# Patient Record
Sex: MALE | Race: OTHER | Employment: FULL TIME | URBAN - METROPOLITAN AREA
[De-identification: names, ages, dates, MRNs, and addresses within clinical notes are randomized per-mention and may not be internally consistent; named-entity substitution may affect disease eponyms.]

---

## 2017-01-12 ENCOUNTER — OFFICE VISIT (OUTPATIENT)
Dept: FAMILY MEDICINE CLINIC | Facility: CLINIC | Age: 52
End: 2017-01-12

## 2017-01-12 VITALS
WEIGHT: 232 LBS | HEART RATE: 81 BPM | SYSTOLIC BLOOD PRESSURE: 118 MMHG | BODY MASS INDEX: 33 KG/M2 | DIASTOLIC BLOOD PRESSURE: 73 MMHG

## 2017-01-12 DIAGNOSIS — R93.1 DECREASED CARDIAC EJECTION FRACTION: Primary | ICD-10-CM

## 2017-01-12 PROCEDURE — 99212 OFFICE O/P EST SF 10 MIN: CPT | Performed by: FAMILY MEDICINE

## 2017-01-12 PROCEDURE — 99214 OFFICE O/P EST MOD 30 MIN: CPT | Performed by: FAMILY MEDICINE

## 2017-01-12 RX ORDER — EPINEPHRINE 0.3 MG/.3ML
0.3 INJECTION SUBCUTANEOUS ONCE
Qty: 1 EACH | Refills: 1 | Status: SHIPPED | OUTPATIENT
Start: 2017-01-12 | End: 2017-01-12

## 2017-01-12 RX ORDER — CEPHALEXIN 500 MG/1
500 TABLET ORAL 4 TIMES DAILY
Qty: 40 TABLET | Refills: 0 | Status: SHIPPED | OUTPATIENT
Start: 2017-01-12 | End: 2017-01-12

## 2017-01-12 RX ORDER — ESCITALOPRAM OXALATE 10 MG/1
10 TABLET ORAL DAILY
Qty: 30 TABLET | Refills: 2 | Status: SHIPPED | OUTPATIENT
Start: 2017-01-12 | End: 2017-07-18

## 2017-01-12 NOTE — PROGRESS NOTES
Blood pressure 118/73, pulse 81, weight 232 lb (105.235 kg). Patient presents today complaining of feeling weak in the knees whenever he enters a large store. He denies chest pain or dyspnea. angiogram within the past 3 years.   He also reports that h

## 2017-01-23 ENCOUNTER — HOSPITAL ENCOUNTER (OUTPATIENT)
Dept: CARDIOLOGY CLINIC | Age: 52
Discharge: HOME | End: 2017-01-23
Attending: FAMILY MEDICINE
Payer: COMMERCIAL

## 2017-01-23 ENCOUNTER — LAB ENCOUNTER (OUTPATIENT)
Dept: LAB | Age: 52
End: 2017-01-23
Attending: FAMILY MEDICINE
Payer: COMMERCIAL

## 2017-01-23 DIAGNOSIS — R93.1 DECREASED CARDIAC EJECTION FRACTION: ICD-10-CM

## 2017-01-23 LAB
ANION GAP SERPL CALC-SCNC: 10 MMOL/L (ref 0–18)
BASOPHILS # BLD: 0 K/UL (ref 0–0.2)
BASOPHILS NFR BLD: 1 %
BUN SERPL-MCNC: 15 MG/DL (ref 8–20)
BUN/CREAT SERPL: 12.7 (ref 10–20)
CALCIUM SERPL-MCNC: 9.5 MG/DL (ref 8.5–10.5)
CHLORIDE SERPL-SCNC: 106 MMOL/L (ref 95–110)
CHOLEST SERPL-MCNC: 194 MG/DL (ref 110–200)
CO2 SERPL-SCNC: 23 MMOL/L (ref 22–32)
CREAT SERPL-MCNC: 1.18 MG/DL (ref 0.5–1.5)
EOSINOPHIL # BLD: 0.1 K/UL (ref 0–0.7)
EOSINOPHIL NFR BLD: 1 %
ERYTHROCYTE [DISTWIDTH] IN BLOOD BY AUTOMATED COUNT: 13.5 % (ref 11–15)
GLUCOSE SERPL-MCNC: 117 MG/DL (ref 70–99)
HCT VFR BLD AUTO: 47.1 % (ref 41–52)
HDLC SERPL-MCNC: 38 MG/DL
HGB BLD-MCNC: 16 G/DL (ref 13.5–17.5)
LDLC SERPL CALC-MCNC: 113 MG/DL (ref 0–99)
LYMPHOCYTES # BLD: 2.3 K/UL (ref 1–4)
LYMPHOCYTES NFR BLD: 34 %
MCH RBC QN AUTO: 30.5 PG (ref 27–32)
MCHC RBC AUTO-ENTMCNC: 34 G/DL (ref 32–37)
MCV RBC AUTO: 89.8 FL (ref 80–100)
MONOCYTES # BLD: 0.4 K/UL (ref 0–1)
MONOCYTES NFR BLD: 7 %
NEUTROPHILS # BLD AUTO: 3.8 K/UL (ref 1.8–7.7)
NEUTROPHILS NFR BLD: 57 %
NONHDLC SERPL-MCNC: 156 MG/DL
OSMOLALITY UR CALC.SUM OF ELEC: 290 MOSM/KG (ref 275–295)
PLATELET # BLD AUTO: 154 K/UL (ref 140–400)
PMV BLD AUTO: 9.5 FL (ref 7.4–10.3)
POTASSIUM SERPL-SCNC: 3.9 MMOL/L (ref 3.3–5.1)
PSA SERPL-MCNC: 1.5 NG/ML (ref 0–4)
RBC # BLD AUTO: 5.24 M/UL (ref 4.5–5.9)
SODIUM SERPL-SCNC: 139 MMOL/L (ref 136–144)
TRIGL SERPL-MCNC: 216 MG/DL (ref 1–149)
TSH SERPL-ACNC: 2.08 UIU/ML (ref 0.34–5.6)
WBC # BLD AUTO: 6.7 K/UL (ref 4–11)

## 2017-01-23 PROCEDURE — 85025 COMPLETE CBC W/AUTO DIFF WBC: CPT

## 2017-01-23 PROCEDURE — 80061 LIPID PANEL: CPT

## 2017-01-23 PROCEDURE — 93306 TTE W/DOPPLER COMPLETE: CPT

## 2017-01-23 PROCEDURE — 80048 BASIC METABOLIC PNL TOTAL CA: CPT

## 2017-01-23 PROCEDURE — 84443 ASSAY THYROID STIM HORMONE: CPT

## 2017-01-23 PROCEDURE — 36415 COLL VENOUS BLD VENIPUNCTURE: CPT

## 2017-01-23 PROCEDURE — 93306 TTE W/DOPPLER COMPLETE: CPT | Performed by: INTERNAL MEDICINE

## 2017-01-24 ENCOUNTER — TELEPHONE (OUTPATIENT)
Dept: FAMILY MEDICINE CLINIC | Facility: CLINIC | Age: 52
End: 2017-01-24

## 2017-01-24 NOTE — TELEPHONE ENCOUNTER
----- Message from Saul Martinez DO sent at 1/23/2017  7:54 PM CST -----  BLOOD SUGAR HIGH NOT DIABETIC ALSO ECHO SHOWED MILDLY DECREASED EJECTION FRACTION.   PATIENT TO MAKE FU APPT TO SEE ME.

## 2017-01-24 NOTE — TELEPHONE ENCOUNTER
Pt informed. Verbalized good understanding of all with intent to comply. appt made. Pt agreed to date and time.   results released to [employer] per pt request.

## 2017-01-26 ENCOUNTER — OFFICE VISIT (OUTPATIENT)
Dept: FAMILY MEDICINE CLINIC | Facility: CLINIC | Age: 52
End: 2017-01-26

## 2017-01-26 VITALS
HEART RATE: 85 BPM | SYSTOLIC BLOOD PRESSURE: 126 MMHG | BODY MASS INDEX: 32 KG/M2 | WEIGHT: 227.81 LBS | DIASTOLIC BLOOD PRESSURE: 81 MMHG

## 2017-01-26 DIAGNOSIS — F41.0 PANIC: ICD-10-CM

## 2017-01-26 DIAGNOSIS — R73.9 HYPERGLYCEMIA: Primary | ICD-10-CM

## 2017-01-26 DIAGNOSIS — R93.1 DECREASED CARDIAC EJECTION FRACTION: ICD-10-CM

## 2017-01-26 PROCEDURE — 99212 OFFICE O/P EST SF 10 MIN: CPT | Performed by: FAMILY MEDICINE

## 2017-01-26 PROCEDURE — 99213 OFFICE O/P EST LOW 20 MIN: CPT | Performed by: FAMILY MEDICINE

## 2017-01-26 NOTE — PROGRESS NOTES
Blood pressure 126/81, pulse 85, weight 227 lb 12.8 oz (103.329 kg). Patient presents today following up for elevated blood sugar reading history of decreased ejection fraction. He currently feels well. Is losing weight deliberately.   He stopped april

## 2017-07-17 ENCOUNTER — TELEPHONE (OUTPATIENT)
Dept: FAMILY MEDICINE CLINIC | Facility: CLINIC | Age: 52
End: 2017-07-17

## 2017-07-17 NOTE — TELEPHONE ENCOUNTER
Actions Requested: appt made Tuesday [facility] at [facility]. (spouse called for patient)  Problem: ankle, pain 6/10  Onset and Timin attacks since April. Associated Symptoms:    Aggravating by:    Alleviated by:  Cannot take NSAIDS,     Triage Note:  Per Spouse,

## 2017-07-18 ENCOUNTER — OFFICE VISIT (OUTPATIENT)
Dept: FAMILY MEDICINE CLINIC | Facility: CLINIC | Age: 52
End: 2017-07-18

## 2017-07-18 VITALS
SYSTOLIC BLOOD PRESSURE: 122 MMHG | WEIGHT: 225.81 LBS | BODY MASS INDEX: 32.33 KG/M2 | HEIGHT: 70 IN | HEART RATE: 75 BPM | TEMPERATURE: 98 F | DIASTOLIC BLOOD PRESSURE: 82 MMHG

## 2017-07-18 DIAGNOSIS — M79.671 FOOT PAIN, BILATERAL: Primary | ICD-10-CM

## 2017-07-18 DIAGNOSIS — M79.672 FOOT PAIN, BILATERAL: Primary | ICD-10-CM

## 2017-07-18 PROCEDURE — 99212 OFFICE O/P EST SF 10 MIN: CPT | Performed by: FAMILY MEDICINE

## 2017-07-18 PROCEDURE — 99214 OFFICE O/P EST MOD 30 MIN: CPT | Performed by: FAMILY MEDICINE

## 2017-07-18 RX ORDER — COLCHICINE 0.6 MG/1
0.6 TABLET ORAL 4 TIMES DAILY PRN
Qty: 45 TABLET | Refills: 0 | Status: SHIPPED | OUTPATIENT
Start: 2017-07-18 | End: 2017-11-02 | Stop reason: ALTCHOICE

## 2017-07-18 NOTE — PROGRESS NOTES
Blood pressure 122/82, pulse 75, temperature 97.9 °F (36.6 °C), temperature source Oral, height 5' 10\" (1.778 m), weight 225 lb 12.8 oz (102.4 kg). Patient presents today complaining of bilateral ankle and foot pain.   He reports it began 2 days ago aft

## 2017-10-27 ENCOUNTER — HOSPITAL ENCOUNTER (OUTPATIENT)
Dept: GENERAL RADIOLOGY | Age: 52
End: 2017-10-27
Attending: FAMILY MEDICINE
Payer: COMMERCIAL

## 2017-10-27 ENCOUNTER — HOSPITAL ENCOUNTER (OUTPATIENT)
Dept: GENERAL RADIOLOGY | Age: 52
Discharge: HOME | End: 2017-10-27
Attending: FAMILY MEDICINE
Payer: COMMERCIAL

## 2017-10-27 ENCOUNTER — LAB ENCOUNTER (OUTPATIENT)
Dept: LAB | Age: 52
End: 2017-10-27
Attending: FAMILY MEDICINE
Payer: COMMERCIAL

## 2017-10-27 DIAGNOSIS — M79.672 FOOT PAIN, BILATERAL: ICD-10-CM

## 2017-10-27 DIAGNOSIS — M79.671 FOOT PAIN, BILATERAL: ICD-10-CM

## 2017-10-27 DIAGNOSIS — R73.9 HYPERGLYCEMIA: ICD-10-CM

## 2017-10-27 PROCEDURE — 80061 LIPID PANEL: CPT

## 2017-10-27 PROCEDURE — 80048 BASIC METABOLIC PNL TOTAL CA: CPT

## 2017-10-27 PROCEDURE — 84550 ASSAY OF BLOOD/URIC ACID: CPT

## 2017-10-27 PROCEDURE — 73630 X-RAY EXAM OF FOOT: CPT | Performed by: FAMILY MEDICINE

## 2017-10-27 PROCEDURE — 36415 COLL VENOUS BLD VENIPUNCTURE: CPT

## 2017-10-27 PROCEDURE — 73610 X-RAY EXAM OF ANKLE: CPT | Performed by: FAMILY MEDICINE

## 2017-10-27 PROCEDURE — 85025 COMPLETE CBC W/AUTO DIFF WBC: CPT

## 2017-10-30 ENCOUNTER — TELEPHONE (OUTPATIENT)
Dept: FAMILY MEDICINE CLINIC | Facility: CLINIC | Age: 52
End: 2017-10-30

## 2017-10-30 NOTE — TELEPHONE ENCOUNTER
Pt informed of [facility]'s message below regarding 10/27 labs and xray results. Pt voiced understanding but states his main question is whether or not he has gout.  was told at [address] to have uric acid checked when having flare up of pain and he started having an episode again last week.  pain in foot started as an 8/10 pain rating but today only 3/10. Denies redness/swelling;  just has foot pain that comes every couple of months. States it can be the left foot or right foot and the pain is in joints of the foot like the back of heel or the big toe.  [facility], pt has scheduled appt for 11/2/17, but is asking if based on labs/xray gout can be ruled out then?  Please advise

## 2017-10-30 NOTE — TELEPHONE ENCOUNTER
xrays ankles show mild arthritis also labs with mildly elevated cholesterol and blood sugar no diabetes patient needs fu appt to be seen.

## 2017-11-01 NOTE — TELEPHONE ENCOUNTER
[facility],    Reviewed your message with pt. Stated since taking cholchicine 0.6mg has been with diarrhea 10 times per day. On Sat resting in bed with nausea. I advised pt to stop medicine and increase fluids. Pt has appt 11/2/17 @ 4:50pm with you. Please advise further.

## 2017-11-02 ENCOUNTER — OFFICE VISIT (OUTPATIENT)
Dept: FAMILY MEDICINE CLINIC | Facility: CLINIC | Age: 52
End: 2017-11-02

## 2017-11-02 VITALS
BODY MASS INDEX: 33 KG/M2 | DIASTOLIC BLOOD PRESSURE: 82 MMHG | WEIGHT: 227 LBS | SYSTOLIC BLOOD PRESSURE: 117 MMHG | HEART RATE: 76 BPM

## 2017-11-02 DIAGNOSIS — Z90.5 SOLITARY KIDNEY, ACQUIRED: ICD-10-CM

## 2017-11-02 DIAGNOSIS — R73.01 IMPAIRED FASTING GLUCOSE: ICD-10-CM

## 2017-11-02 DIAGNOSIS — M79.606 PAIN OF LOWER EXTREMITY, UNSPECIFIED LATERALITY: Primary | ICD-10-CM

## 2017-11-02 PROCEDURE — 99212 OFFICE O/P EST SF 10 MIN: CPT | Performed by: FAMILY MEDICINE

## 2017-11-02 PROCEDURE — 99214 OFFICE O/P EST MOD 30 MIN: CPT | Performed by: FAMILY MEDICINE

## 2017-11-02 RX ORDER — PREDNISONE 20 MG/1
20 TABLET ORAL DAILY
Qty: 10 TABLET | Refills: 0 | Status: SHIPPED | OUTPATIENT
Start: 2017-11-02 | End: 2018-01-06

## 2017-11-02 NOTE — PROGRESS NOTES
Blood pressure 117/82, pulse 76, weight 227 lb (103 kg). PATIENT COMPLAINING OF INTERMITTENT flareups of right foot pain that seem to occur 4-5 days at a time. BROTHER HAD GOUT.       Patient has a history of solitary kidney after donating his kidney to

## 2019-07-22 ENCOUNTER — OFFICE VISIT (OUTPATIENT)
Dept: SURGERY | Facility: CLINIC | Age: 54
End: 2019-07-22
Payer: COMMERCIAL

## 2019-07-22 VITALS — DIASTOLIC BLOOD PRESSURE: 82 MMHG | TEMPERATURE: 98 F | SYSTOLIC BLOOD PRESSURE: 127 MMHG | HEART RATE: 71 BPM

## 2019-07-22 DIAGNOSIS — R79.89 LOW TESTOSTERONE IN MALE: ICD-10-CM

## 2019-07-22 DIAGNOSIS — N40.1 BENIGN PROSTATIC HYPERPLASIA WITH URINARY FREQUENCY: ICD-10-CM

## 2019-07-22 DIAGNOSIS — Z90.5 SOLITARY KIDNEY, ACQUIRED: ICD-10-CM

## 2019-07-22 DIAGNOSIS — R35.1 NOCTURIA: ICD-10-CM

## 2019-07-22 DIAGNOSIS — R31.0 GROSS HEMATURIA: Primary | ICD-10-CM

## 2019-07-22 DIAGNOSIS — N20.0 RIGHT KIDNEY STONE: ICD-10-CM

## 2019-07-22 DIAGNOSIS — M10.9 GOUT, UNSPECIFIED CAUSE, UNSPECIFIED CHRONICITY, UNSPECIFIED SITE: ICD-10-CM

## 2019-07-22 DIAGNOSIS — R35.0 BENIGN PROSTATIC HYPERPLASIA WITH URINARY FREQUENCY: ICD-10-CM

## 2019-07-22 PROCEDURE — 99245 OFF/OP CONSLTJ NEW/EST HI 55: CPT | Performed by: UROLOGY

## 2019-07-22 RX ORDER — DIAZEPAM 5 MG/1
TABLET ORAL
Qty: 3 TABLET | Refills: 0 | Status: SHIPPED | OUTPATIENT
Start: 2019-07-22 | End: 2020-12-02

## 2019-07-22 RX ORDER — TRAMADOL HYDROCHLORIDE 50 MG/1
TABLET ORAL
Qty: 1 TABLET | Refills: 0 | Status: SHIPPED | OUTPATIENT
Start: 2019-07-22 | End: 2020-12-02

## 2019-07-22 NOTE — PROGRESS NOTES
Horacio Prince is a 62year old male. HPI:   Patient presents with:  Consult: right kidney stone per CT, blood in urine for 2 weeks with clots. denies pain, frequency, urgency, or burning while urinating.     History provided by patient and his wife, smoked in the 1980s. He denies any significant fume exposure. Family History--The patient denies family history of kidney stones. His sister has endometrial cancer. His brother had gout.         HISTORY:  Past Medical History:   Diagnosis Date   • Headac Genitourinary:  Negative for dysuria; Positive for hematuria, decreased stream, and change in urine color  Positive for sexual activity  Gastrointestinal:  Negative for abdominal pain, constipation, decreased appetite, diarrhea and vomiting; Positive for hernias  TESTES  =  descended bilaterally without nodules and no orchitis or epididymitis. EPIDIDYMIS  Spermatocele of the right epididymis  SCROTUM Unremarkable except for above  URETHRAL MEATUS Unremarkable.     PENIS Non-circumcised; no phimosis  Aminah his right renal stone (see below) however the patient is at increased risk for having tumors of the urinary tract and these need to be excluded.  Therefore we discussed urine cytology, CT of the abdomen and pelvis with and without IV contrast followed by cy patient on potassium citrate and performing a metabolic 24 hour urine collection study.  Should the patient's kidney stone be visible on KUB we will further discuss surgical treatment of patient's kidney stone.    (Z90.5) Solitary kidney, acquired  7/12/201 procedure    7. Please do not take Aleve for 5 days before procedure. Do not take any NSAIDs (medications such as Motrin, ibuprofen, etc.) also for 5 days before procedure.   Okay to take Tylenol even right before the procedure.    ----------------------- Tc Perdomo, 7/22/2019, 3:29 PM.    Chika Tsang MD,  personally performed the services described in this documentation. All medical record entries made by the scribe were at my direction and in my presence.   I have reviewed the chart and di

## 2019-07-22 NOTE — PATIENT INSTRUCTIONS
Murrel Apgar, M.D.      1.  Urine specimen today for cytology    2. KUB plain x-ray--milk of magnesia 2 ounces 6 PM evening before x-ray to improve clarity and visibility of any calcium-containing kidney stones    3.   CT ur

## 2019-07-23 ENCOUNTER — PATIENT MESSAGE (OUTPATIENT)
Dept: SURGERY | Facility: CLINIC | Age: 54
End: 2019-07-23

## 2019-07-24 ENCOUNTER — PATIENT MESSAGE (OUTPATIENT)
Dept: SURGERY | Facility: CLINIC | Age: 54
End: 2019-07-24

## 2019-07-24 ENCOUNTER — TELEPHONE (OUTPATIENT)
Dept: SURGERY | Facility: CLINIC | Age: 54
End: 2019-07-24

## 2019-07-24 NOTE — TELEPHONE ENCOUNTER
Pt states he continues to have blood in urine here and there, asking if this is normal. Pt also calling re: mychart msg left yesterday, upset he has not received cb. Pls call thank you.

## 2019-07-24 NOTE — TELEPHONE ENCOUNTER
Pt's wife called stating sent a message to the office and also called the office. Have not heard back from the office.   Please call pt to advise

## 2019-07-24 NOTE — TELEPHONE ENCOUNTER
Spoke with pt and I apologized that we did not get back to him yesterday AND I EXPLAINED THAT MY CHART MESSAGES ARE NOT ADDRESSED AS QUICKLY AS PT CALLS AND IF THE CALL IS FOR AN ACUTE ISSUE SUCH AS BLEEDING OR PAIN HE SHOULD NEVER SEND A MY CHART MSG AND

## 2019-07-25 ENCOUNTER — TELEPHONE (OUTPATIENT)
Dept: SURGERY | Facility: CLINIC | Age: 54
End: 2019-07-25

## 2019-07-25 DIAGNOSIS — R31.0 HEMATURIA, GROSS: Primary | ICD-10-CM

## 2019-07-25 NOTE — TELEPHONE ENCOUNTER
LM that the CT scan ordered by Dr. Beto Collier is in the system as well as the [facility] [place] xray order. CT needs to be schd at  and xray is a walk in test. CB if more questions.

## 2019-07-25 NOTE — TELEPHONE ENCOUNTER
From: Yazmin Brooke  To: Jeff Elias MD  Sent: 7/24/2019 2:33 PM CDT  Subject: Other    We also did not receive an order for the CT urogram. I have called my insurance and we do not need prior authorization. I would like this done asap.  Please l

## 2019-07-25 NOTE — TELEPHONE ENCOUNTER
Pt asking for an order for a CT Urogram. States pt has already checked with insurance and does not need prior authorization. Pls see pt email from 07/24/19.

## 2019-07-25 NOTE — TELEPHONE ENCOUNTER
Last office visit = 7/22; See treatment plan copied below; CT urogram was not ordered. Dr. Sherryle Poot, (MD on call) will you sign order so that patient can get it scheduled.      RECOMMENDATIONS AND TREATMENT PLAN       1.  Urine specimen today for cytolog

## 2019-07-29 NOTE — TELEPHONE ENCOUNTER
Notify patient that I agree with plan as outlined by nurse Yoselyn Haider, and that patient needs to proceed with the CT urogram that has been already ordered, and also needs to proceed with the KUB plain x-ray, milk of magnesia 2 ounces 6 PM evening before the KUB

## 2019-07-29 NOTE — TELEPHONE ENCOUNTER
Urology nurses,   notify patient that I agree with plan as outlined by nurse Jin Bautista, and that patient needs to proceed with the CT urogram that has been already ordered, and also needs to proceed with the KUB plain x-ray, milk of magnesia 2 ounces 6 PM eveni

## 2019-07-29 NOTE — TELEPHONE ENCOUNTER
LMTCB.  When patient calls back, please try to transfer to RN [phone] or [phone]. Thank you. I sent patient a [employer] message.

## 2019-08-05 ENCOUNTER — HOSPITAL ENCOUNTER (OUTPATIENT)
Dept: CT IMAGING | Facility: HOSPITAL | Age: 54
Discharge: HOME | End: 2019-08-05
Attending: UROLOGY
Payer: COMMERCIAL

## 2019-08-05 DIAGNOSIS — R31.0 HEMATURIA, GROSS: ICD-10-CM

## 2019-08-05 LAB — CREAT BLD-MCNC: 1.1 MG/DL (ref 0.7–1.3)

## 2019-08-05 PROCEDURE — 74178 CT ABD&PLV WO CNTR FLWD CNTR: CPT | Performed by: UROLOGY

## 2019-08-05 PROCEDURE — 82565 ASSAY OF CREATININE: CPT

## 2019-08-07 ENCOUNTER — TELEPHONE (OUTPATIENT)
Dept: SURGERY | Facility: CLINIC | Age: 54
End: 2019-08-07

## 2019-08-08 ENCOUNTER — PATIENT MESSAGE (OUTPATIENT)
Dept: SURGERY | Facility: CLINIC | Age: 54
End: 2019-08-08

## 2019-08-08 NOTE — TELEPHONE ENCOUNTER
It appears pt viewed the message. I have arranged fov in the only available time slot for next week, 8/15th. Sent pt my chart message.     Patient Result Comments     Viewed by Harrison Rocha on 8/8/2019  8:46 AM   Written by Cristobal Miranda MD on 8/7

## 2019-08-08 NOTE — TELEPHONE ENCOUNTER
Phoned pt back and spoke with him. He states he was very upset to receive such a concerning result via \"my chart. \"  I reassured him that Shantell Oneil is very thorough and that is why he wanted him to have an appt , so that he can discuss everything with

## 2019-08-08 NOTE — TELEPHONE ENCOUNTER
Nurses, please call patient and make sure he saw the \"my chart\" message and if he did not, please read at home AND get him in to see me during the next 3 weeks; if no openings, look at Thursdays; if no openings on Thursday, let urology nurse coordinator

## 2019-08-08 NOTE — TELEPHONE ENCOUNTER
Patient called very upset stating why didn't he receive a direct call from [facility] regarding CT results instead of receiving a [employer] message. Apologized to patient and informed him that I will pass along his concerns to [facility].  Patient is concerned if tumor or

## 2019-08-09 NOTE — TELEPHONE ENCOUNTER
Shree,   It is correct that previously scheduled office cystoscopy should be canceled and that patient should be given appointment to see me in the office to discuss performing cystoscopy, possible bladder biopsy under general anesthesia.     With respect to

## 2019-08-26 NOTE — TELEPHONE ENCOUNTER
, please see pt's \"my chart\" message. Please also note that pt was called, per your request on 8/07/19. Please advise if pt still needs an fov, as it appears pt is questioning in a \"my chart\" message if he can just be scheduled for surgery.  It do

## 2019-08-26 NOTE — TELEPHONE ENCOUNTER
Please call patient back. I will need a few days to get to reviewing his entire chart all over again; I have emergency surgery tonight; I will call tomorrow nights again.   Thanks, Dr. Jose Chapin

## 2019-08-27 NOTE — PROGRESS NOTES
Trey Rodriguez, please review and advise, I was going to schedule this patient today, I saw his note and would like to know if I should still schedule. I copied and pasted: To say that this is disappointing is an understatement.  As I see the date that I

## 2019-08-27 NOTE — TELEPHONE ENCOUNTER
Nurses, please call patient back. I am getting instructions to surgery scheduler Janey Grace for    Cystoscopy, possible bladder biopsy--general anesthesia, [facility], same-day surgery, 1 hour.   Diagnosis is gross hematuria and abnormal bladder

## 2019-09-19 ENCOUNTER — TELEPHONE (OUTPATIENT)
Dept: SURGERY | Facility: CLINIC | Age: 54
End: 2019-09-19

## 2020-12-07 ENCOUNTER — APPOINTMENT (OUTPATIENT)
Dept: GENERAL RADIOLOGY | Facility: HOSPITAL | Age: 55
End: 2020-12-07
Attending: EMERGENCY MEDICINE
Payer: COMMERCIAL

## 2020-12-07 ENCOUNTER — HOSPITAL ENCOUNTER (EMERGENCY)
Facility: HOSPITAL | Age: 55
Discharge: HOME | End: 2020-12-07
Attending: EMERGENCY MEDICINE
Payer: COMMERCIAL

## 2020-12-07 VITALS
HEART RATE: 90 BPM | TEMPERATURE: 98 F | SYSTOLIC BLOOD PRESSURE: 122 MMHG | DIASTOLIC BLOOD PRESSURE: 85 MMHG | RESPIRATION RATE: 22 BRPM | OXYGEN SATURATION: 93 %

## 2020-12-07 DIAGNOSIS — J12.82 PNEUMONIA DUE TO COVID-19 VIRUS: Primary | ICD-10-CM

## 2020-12-07 DIAGNOSIS — U07.1 PNEUMONIA DUE TO COVID-19 VIRUS: Primary | ICD-10-CM

## 2020-12-07 PROCEDURE — 71045 X-RAY EXAM CHEST 1 VIEW: CPT | Performed by: EMERGENCY MEDICINE

## 2020-12-07 PROCEDURE — 99283 EMERGENCY DEPT VISIT LOW MDM: CPT

## 2020-12-07 RX ORDER — GUAIFENESIN/DEXTROMETHORPHAN 100-10MG/5
100 SYRUP ORAL ONCE
Status: COMPLETED | OUTPATIENT
Start: 2020-12-07 | End: 2020-12-07

## 2020-12-07 RX ORDER — CODEINE PHOSPHATE AND GUAIFENESIN 10; 100 MG/5ML; MG/5ML
5 SOLUTION ORAL EVERY 6 HOURS PRN
Qty: 118 ML | Refills: 0 | Status: SHIPPED | OUTPATIENT
Start: 2020-12-07 | End: 2020-12-22

## 2020-12-07 NOTE — ED PROVIDER NOTES
Patient Seen in: [facility] AND [facility] Emergency Department      History   Patient presents with:  Covid    Stated Complaint: covid    HPI    66-year-old male with recent diagnosis of COVID-19 7 days ago, here with complaints of ongoing hacking cough in sven reviewed and negative except as noted above. Physical Exam     ED Triage Vitals [12/07/20 1005]   /85   Pulse 110   Resp 22   Temp 97.9 °F (36.6 °C)   Temp src    SpO2 93 %   O2 Device None (Room air)       Current:/87   Pulse 94   Temp 97. 9 - well appearing - offered admission, pt declining  - discussed with Dr. Jennifer Stone with close outpt followup    Medical Record Review: I personally reviewed available prior medical records for any recent pertinent discharge summaries, testing, and proc

## 2020-12-07 NOTE — ED INITIAL ASSESSMENT (HPI)
Ceola Paget was diagnosed with COVID-19 seven days ago. He presents with complaints of persistent cough and fever (TMAX 100).   Took tylenol at 7am.

## 2020-12-08 ENCOUNTER — HOSPITAL ENCOUNTER (INPATIENT)
Facility: HOSPITAL | Age: 55
LOS: 5 days | Discharge: HOME | DRG: 177 | End: 2020-12-13
Attending: EMERGENCY MEDICINE | Admitting: INTERNAL MEDICINE
Payer: COMMERCIAL

## 2020-12-08 ENCOUNTER — APPOINTMENT (OUTPATIENT)
Dept: GENERAL RADIOLOGY | Facility: HOSPITAL | Age: 55
DRG: 177 | End: 2020-12-08
Attending: EMERGENCY MEDICINE
Payer: COMMERCIAL

## 2020-12-08 DIAGNOSIS — J12.82 PNEUMONIA DUE TO COVID-19 VIRUS: Primary | ICD-10-CM

## 2020-12-08 DIAGNOSIS — I50.20 SYSTOLIC CONGESTIVE HEART FAILURE, UNSPECIFIED HF CHRONICITY (HCC): ICD-10-CM

## 2020-12-08 DIAGNOSIS — U07.1 PNEUMONIA DUE TO COVID-19 VIRUS: Primary | ICD-10-CM

## 2020-12-08 DIAGNOSIS — R09.02 HYPOXIA: ICD-10-CM

## 2020-12-08 PROCEDURE — XW033H5 INTRODUCTION OF TOCILIZUMAB INTO PERIPHERAL VEIN, PERCUTANEOUS APPROACH, NEW TECHNOLOGY GROUP 5: ICD-10-PCS | Performed by: INTERNAL MEDICINE

## 2020-12-08 PROCEDURE — 99283 EMERGENCY DEPT VISIT LOW MDM: CPT | Performed by: INTERNAL MEDICINE

## 2020-12-08 PROCEDURE — XW033E5 INTRODUCTION OF REMDESIVIR ANTI-INFECTIVE INTO PERIPHERAL VEIN, PERCUTANEOUS APPROACH, NEW TECHNOLOGY GROUP 5: ICD-10-PCS | Performed by: INTERNAL MEDICINE

## 2020-12-08 PROCEDURE — XW13325 TRANSFUSION OF CONVALESCENT PLASMA (NONAUTOLOGOUS) INTO PERIPHERAL VEIN, PERCUTANEOUS APPROACH, NEW TECHNOLOGY GROUP 5: ICD-10-PCS | Performed by: INTERNAL MEDICINE

## 2020-12-08 PROCEDURE — 71045 X-RAY EXAM CHEST 1 VIEW: CPT | Performed by: EMERGENCY MEDICINE

## 2020-12-08 PROCEDURE — 3E0333Z INTRODUCTION OF ANTI-INFLAMMATORY INTO PERIPHERAL VEIN, PERCUTANEOUS APPROACH: ICD-10-PCS | Performed by: INTERNAL MEDICINE

## 2020-12-08 RX ORDER — DEXAMETHASONE SODIUM PHOSPHATE 4 MG/ML
6 VIAL (ML) INJECTION DAILY
Status: DISCONTINUED | OUTPATIENT
Start: 2020-12-08 | End: 2020-12-09

## 2020-12-08 RX ORDER — FAMOTIDINE 20 MG/1
20 TABLET ORAL 2 TIMES DAILY
Status: DISCONTINUED | OUTPATIENT
Start: 2020-12-08 | End: 2020-12-13

## 2020-12-08 RX ORDER — DEXAMETHASONE SODIUM PHOSPHATE 10 MG/ML
6 INJECTION, SOLUTION INTRAMUSCULAR; INTRAVENOUS ONCE
Status: COMPLETED | OUTPATIENT
Start: 2020-12-08 | End: 2020-12-08

## 2020-12-08 RX ORDER — ACETAMINOPHEN 325 MG/1
650 TABLET ORAL EVERY 6 HOURS PRN
Status: DISCONTINUED | OUTPATIENT
Start: 2020-12-08 | End: 2020-12-13

## 2020-12-08 RX ORDER — ENOXAPARIN SODIUM 100 MG/ML
40 INJECTION SUBCUTANEOUS DAILY
Status: DISCONTINUED | OUTPATIENT
Start: 2020-12-08 | End: 2020-12-13

## 2020-12-08 RX ORDER — ENOXAPARIN SODIUM 100 MG/ML
30 INJECTION SUBCUTANEOUS DAILY
Status: DISCONTINUED | OUTPATIENT
Start: 2020-12-08 | End: 2020-12-08

## 2020-12-08 RX ORDER — DEXAMETHASONE SODIUM PHOSPHATE 10 MG/ML
6 INJECTION, SOLUTION INTRAMUSCULAR; INTRAVENOUS ONCE
Status: DISCONTINUED | OUTPATIENT
Start: 2020-12-08 | End: 2020-12-08

## 2020-12-08 RX ORDER — LORAZEPAM 0.5 MG/1
0.5 TABLET ORAL NIGHTLY
Status: DISCONTINUED | OUTPATIENT
Start: 2020-12-08 | End: 2020-12-09

## 2020-12-08 RX ORDER — SODIUM CHLORIDE 9 MG/ML
INJECTION, SOLUTION INTRAVENOUS ONCE
Status: DISCONTINUED | OUTPATIENT
Start: 2020-12-08 | End: 2020-12-13

## 2020-12-08 NOTE — PROGRESS NOTES
Pt covid+ 11/30. Symptoms started 11/30  Currently on 2L NC SpO2 92%, afebrile. Inflammatory markers elevated. CCP ordered 12/8  S/P Actemra 12/8  RDV day 1/5  Continue Lovenox daily and decadron PO. ID and Pulmonology following.    Trend inflammator

## 2020-12-08 NOTE — CONSULTS
[facility]    Consult Note    Date:  12/8/2020  Date of Admission:  12/8/2020      Chief Complaint:   Karol Haines is a(n) 60year old male with dyspnea. HPI:   9 days ago the patient developed fever.   8 days ago the patient tested nose and throat normal. Bowel normal. Bladder function normal. No depression. No thyroid disease. No rash. Muscles and joints unremarkable. No weight loss no weight gain.     Physical Exam:   Vital Signs:  Blood pressure 139/84, pulse 111, temperature 99.5 and with clinical worsening, will use Actemra. Requiring 4 L oxygen at present. Recommendations:  1. Dexamethasone  2. Lovenox  3. We will consider remdesivir. He now is at 9 days. To discuss with ID.  4.  Convalescent plasma  5.   We will trend in

## 2020-12-08 NOTE — ED INITIAL ASSESSMENT (HPI)
PATIENT AOX3 TO E VIA PRIVATE VEHICLE PATIENT CO OF WORSENING SHORTNESS OF BREATH X YESTERDAY, FOUND SPO2 TO BE IN THE 80S AT HOME. PATIENT PRESENTS TO ED SPO2 82%RA. SPEAKING IN FULL SENTENCES.  PATIENT TESTED POSITIVE FOR COVID19 11/30/20   PATIENT DENIES

## 2020-12-08 NOTE — ED PROVIDER NOTES
Patient Seen in: [facility] AND [facility] Emergency Department    History   Patient presents with:  Difficulty Breathing    Stated Complaint: +covid low spo2 84%    HPI    Patient is here again he was here yesterday he has been diagnosed with Covid recently sta above.      Physical Exam     ED Triage Vitals [12/08/20 1162]   /84   Pulse 108   Resp (!) 30   Temp 99.5 °F (37.5 °C)   Temp src Oral   SpO2 (!) 82 %   O2 Device None (Room air)       Current:/89   Pulse 102   Temp 99.5 °F (37.5 °C) (Oral) interpreting tests and discussion with consultants but not including time spent performing separately billable procedures.         ED Course     Labs Reviewed   COMP METABOLIC PANEL (14) - Abnormal; Notable for the following components:       Result Value REFLEX   TYPE AND SCREEN   RAINBOW DRAW BLUE   RAINBOW DRAW LAVENDER   RAINBOW DRAW LIGHT GREEN   RAINBOW DRAW GOLD   BLOOD CULTURE   BLOOD CULTURE     EKG    Rate, intervals and axes as noted on EKG Report.   Rate: EKG shows rate 110 normal sinus rhythm no

## 2020-12-08 NOTE — H&P
[facility]OSP - [facility]    History and Physical    Gwendalyn Heading Patient Status:  Emergency    3/15/1965 MRN [id number]   Location [address] Attending Sammy Subramanian MD   Hosp Day # 0 PCP Nyasia Sutton MD     Da hospital admission)      Review of Systems:     Constitutional: Positive for fatigue and fever. Respiratory: Positive for cough and shortness of breath.         Physical Exam:   Vital Signs:  Blood pressure 139/84, pulse 111, temperature 99.5 °F (37.5 °C) Assessment/Plan:      *Covid -19 PNA  Positive 11/30/20  CXR 12/7: viral pna  D-dimer 1.1  inflammatory markers & troponin  Pending  02 needs: 3Lnc    *Fever  Blood cx  Urine Cx  PCT pending    *HFrEF  EF 2017: 45%, recent echo EF 57%  No meds    *Hx

## 2020-12-08 NOTE — PLAN OF CARE
Problem: Patient Centered Care  Goal: Patient preferences are identified and integrated in the patient's plan of care  Description: Interventions:  - What would you like us to know as we care for you?  *  - Provide timely, complete, and accurate informati

## 2020-12-08 NOTE — ED NOTES
Orders for admission, patient is aware of plan and ready to go upstairs. Any questions, please call ED RN [address] 75  at extension [phone].    Type of COVID test sent:  Previously tested  COVID Suspicion level: postivie    LOC at time of transport: alert and [place]

## 2020-12-08 NOTE — CONSULTS
[facility] - [facility] ID CONSULT NOTE    Morley Vikash Patient Status:  Emergency    3/15/1965 MRN [id number]   Location [address] Attending Roseann Aaron MD   Hosp Day # 0 PCP Shanti Nielsen MD Allergies:  No Known Allergies    Medications:    Current Facility-Administered Medications:   •  remdesivir 200 mg in sodium chloride 0.9% 250 mL IVPB, 200 mg, Intravenous, Once  •  [START ON 12/9/2020] remdesivir 100 mg in sodium chloride 0.9% 250 mL IVP 24-year-old male with a history of CHF, obesity who now presents to hospital with symptoms starting last Monday 11/30 including cough, shortness of breath, fevers, fatigue, chest discomfort 9 or to be hypoxic in the mid 80s at home.   Was started on azithro

## 2020-12-09 PROCEDURE — 99232 SBSQ HOSP IP/OBS MODERATE 35: CPT | Performed by: INTERNAL MEDICINE

## 2020-12-09 RX ORDER — ALBUTEROL SULFATE 90 UG/1
2 AEROSOL, METERED RESPIRATORY (INHALATION) EVERY 4 HOURS PRN
Status: DISCONTINUED | OUTPATIENT
Start: 2020-12-09 | End: 2020-12-13

## 2020-12-09 RX ORDER — DEXAMETHASONE 6 MG/1
6 TABLET ORAL DAILY
Status: DISCONTINUED | OUTPATIENT
Start: 2020-12-10 | End: 2020-12-13

## 2020-12-09 RX ORDER — BENZONATATE 100 MG/1
100 CAPSULE ORAL 3 TIMES DAILY PRN
Status: DISCONTINUED | OUTPATIENT
Start: 2020-12-09 | End: 2020-12-11

## 2020-12-09 RX ORDER — ALPRAZOLAM 0.5 MG/1
0.5 TABLET ORAL NIGHTLY PRN
Status: DISCONTINUED | OUTPATIENT
Start: 2020-12-09 | End: 2020-12-13

## 2020-12-09 RX ORDER — ALLOPURINOL 300 MG/1
300 TABLET ORAL DAILY
Status: DISCONTINUED | OUTPATIENT
Start: 2020-12-09 | End: 2020-12-13

## 2020-12-09 RX ORDER — GUAIFENESIN 100 MG/5ML
100 SOLUTION ORAL EVERY 4 HOURS PRN
Status: DISCONTINUED | OUTPATIENT
Start: 2020-12-09 | End: 2020-12-11

## 2020-12-09 RX ORDER — HYDROCODONE POLISTIREX AND CHLORPHENIRAMINE POLISTIREX 10; 8 MG/5ML; MG/5ML
5 SUSPENSION, EXTENDED RELEASE ORAL 2 TIMES DAILY PRN
Status: DISCONTINUED | OUTPATIENT
Start: 2020-12-09 | End: 2020-12-13

## 2020-12-09 NOTE — PAYOR COMM NOTE
--------------  ADMISSION REVIEW     Nanilisettegadiel WALTERS  Subscriber #:  [id number]  Authorization Number: [id number]      Admit date: 12/8/20  Admit time: 1059       Admitting Physician: Crow Tan MD  Attending Physician:  Crow Tan MD  Primary Care guaiFENesin-codeine (CHERATUSSIN AC) 100-10 MG/5ML Oral Solution,  Take 5 mL by mouth every 6 (six) hours as needed for cough.    dexamethasone 6 MG Oral Tab,  Take 1 tablet (6 mg total) by mouth daily with breakfast.   allopurinol 300 MG Oral Tab,  Take 1 department to evaluate and treat this patient. He accepted patient for admission. He did request pulmonary consultation I did speak with Dr. Angela Katz who came to the ER to see the patient he requested IV Decadron.   I also spoke with Dr. Abram Freitas NATRIURETIC PEPTIDE - Normal   TROPONIN I - Normal   CK CREATINE KINASE (NOT CREATININE) - Normal   CBC WITH DIFFERENTIAL WITH PLATELET    Narrative: The following orders were created for panel order CBC WITH DIFFERENTIAL WITH PLATELET.   Procedure (Physician)         [facility]OSP - [facility]    History and [address] Patient Status:  Emergency    3/15/1965 MRN [id number]   Location [address] Attending Stacia Berry MD   Hosp Day # 0 PCP [facility] Allergies  (Not in a hospital admission)      Review of Systems:     Constitutional: Positive for fatigue and fever. Respiratory: Positive for cough and shortness of breath.         Physical Exam:   Vital Signs:  Blood pressure 139/84, pulse 111, temperat --------------------------       Assessment/Plan:      *Covid -19 PNA  Positive 11/30/20  CXR 12/7: viral pna  D-dimer 1.1  inflammatory markers & troponin  Pending  02 needs: 3Lnc    *Fever  Blood cx  Urine Cx  PCT pending    *HFrEF  EF 2017: 45%, recent dyspnea.     HPI:   9 days ago the patient developed fever. 8 days ago the patient tested positive for Covid. Over the past week the patient has had progressive cough with dyspnea. He received azithromycin and Decadron in the outpatient setting.   The cl infiltrates     Assessment/Plan:   1. Coronavirus pneumonia–clinically worsening with worsened saturations and increased infiltrates radiographically with ongoing shortness of breath, cough, fever. The infiltrates are moderately extensive.   The patient h to hospital with symptoms starting last Monday 11/30 including cough, shortness of breath, fevers, fatigue, chest discomfort 9 or to be hypoxic in the mid 80s at home.   Was started on azithromycin last Tuesday 12/1 and dexamethasone 6 mg daily 12/2 which s %.     Constitutional: He is oriented to person, place, and time. He appears well-developed and well-nourished. He appears distressed. HENT:   Head: Normocephalic and atraumatic. Neck: Neck supple. Cardiovascular: Normal rate and regular rhythm.     P same.     Recommendations:  1.  Dexamethasone  2.  Lovenox  3.  Remdesivir–initiated  4.  Convalescent plasma–ordered  5.  We will trend inflammatory markers  6.  Actemra–given  7.  We will follow clinically  8.  Repeat chest x-ray tomorrow.     2.  DVT pr

## 2020-12-09 NOTE — PLAN OF CARE
Problem: Patient Centered Care  Goal: Patient preferences are identified and integrated in the patient's plan of care  Description: Interventions:  - What would you like us to know as we care for you?  From home with my wife   - Provide timely, complete, distraction and/or relaxation techniques  - Monitor for opioid side effects  - Notify MD/LIP if interventions unsuccessful or patient reports new pain  - Anticipate increased pain with activity and pre-medicate as appropriate  12/8/2020 2219 by Nestor Fink Complete POLST form as appropriate  - Assess patient's ability to be responsible for managing their own health  - Refer to Case Management Department for coordinating discharge planning if the patient needs post-hospital services based on physician/LIP ord

## 2020-12-09 NOTE — PROGRESS NOTES
Pt covid+ 11/30. Symptoms started 11/30  Currently on 3L NC SpO2 91%, afebrile. Inflammatory markers trending up. CCP ordered 12/8 - awaiting. S/P Actemra 12/8  RDV day 2/5  Continue Lovenox daily and decadron PO. Pulmonology following.    Trend i

## 2020-12-09 NOTE — PLAN OF CARE
Problem: Patient Centered Care  Goal: Patient preferences are identified and integrated in the patient's plan of care  Description: Interventions:  - What would you like us to know as we care for you?  My wife is a Nurse   - Provide timely, complete, and additional Care Plan goals for specific interventions  Outcome: Progressing  Goal: Patient/Family Short Term Goal  Description: Patient's Short Term Goal: able to breath better    Interventions:   - medication, 02  - See additional Care Plan goals for spec

## 2020-12-09 NOTE — PROGRESS NOTES
[facility]    Progress Note    Connie Guzmán Patient Status:  Inpatient    3/15/1965 MRN [id number]   Location [address] [facility] Attending Kb Valerio MD   Hosp Day # 1 PCP Jordan Garcia MD        Subjective:     [facility]     *Hx gout  No acute issues/allopurinol        Code status: FULL  VTE prophylaxis:start lovenox  GI prophylaxis: start pepcid          Results:     Lab Results   Component Value Date    WBC 7.7 12/09/2020    HGB 15.4 12/09/2020    HCT 45.7 12/09/ Sinus Tachycardia -Short IA syndrome Key = 110 When compared with ECG of 06/10/2013 20:15:56rate increased Electronically signed on 12/08/2020 at 14:48 by Maria Antonia Christine MD  12/9/2020

## 2020-12-10 PROCEDURE — 99232 SBSQ HOSP IP/OBS MODERATE 35: CPT | Performed by: INTERNAL MEDICINE

## 2020-12-10 NOTE — PROGRESS NOTES
[facility]    Progress Note    Karol Haines Patient Status:  Inpatient    3/15/1965 MRN [id number]   Location [address] [facility] Attending Madelaine Barton MD   Hosp Day # 2 PCP Kaitlin Mckeon MD        Subjective:     Constitutio 2017: 45%, recent echo EF 57%  No meds     *Hx Kidney stone 2019  Follows urology     *Hx gout  No acute issues/allopurinol        Code status: FULL  VTE prophylaxis: lovenox  GI prophylaxis: pepcid          Results:     Lab Results   Component Value Date

## 2020-12-10 NOTE — PLAN OF CARE
Problem: Patient Centered Care  Goal: Patient preferences are identified and integrated in the patient's plan of care  Description: Interventions  - What would you like us to know as we care for you?  From home with family  - Provide timely, complete, and ADULT  Goal: Absence of fever/infection during anticipated neutropenic period  Description: INTERVENTIONS  - Monitor WBC  - Administer growth factors as ordered  - Implement neutropenic guidelines  Outcome: Progressing     Problem: [address] [facility] behavior  - Position to facilitate oxygenation and minimize respiratory effort  - Oxygen supplementation based on oxygen saturation or ABGs  - Provide Smoking Cessation handout, if applicable  - Encourage broncho-pulmonary hygiene including cough, deep jeanie

## 2020-12-10 NOTE — PLAN OF CARE
Pt Aox4. On 6-8L HFNC throughout night. Encouraged proning as tolerated. Pt to receive CCP when ready. Spoke with patient's wife and discussed status and POC. VSS. Will continue to monitor.      Problem: Patient Centered Care  Goal: Patient preferences are Progressing     Problem: RISK FOR INFECTION - ADULT  Goal: Absence of fever/infection during anticipated neutropenic period  Description: INTERVENTIONS  - Monitor WBC  - Administer growth factors as ordered  - Implement neutropenic guidelines  Outcome: Pro therapy   - IS  - Prone as tolerated  - Follow orders per MD  - See additional Care Plan goals for specific interventions  Outcome: Not Progressing     Problem: RESPIRATORY - ADULT  Goal: Achieves optimal ventilation and oxygenation  Description: INTERVENT

## 2020-12-10 NOTE — PROGRESS NOTES
[facility] - [facility]    Progress Note      Assessment and Plan:   1. Coronavirus pneumonia– The infiltrates are moderately extensive. Remdesivir has been initiated.    The inflammatory markers are significantly elevated, and Actemra has been given 12/10/2020     12/10/2020    CO2 28.0 12/10/2020     12/10/2020    CA 9.3 12/10/2020    ALB 2.7 12/10/2020    ALKPHO 73 12/10/2020    BILT 0.7 12/10/2020    TP 7.3 12/10/2020    AST 71 12/10/2020     12/10/2020    DDIMER 1.18 12/10/2020

## 2020-12-10 NOTE — PROGRESS NOTES
Pt covid+ 11/30. Symptoms started 11/30  Currently on 6L NC SpO2 94%, afebrile. Inflammatory markers trending up, except CRP trending down. Pt self-proning. CCP ordered 12/8 - awaiting.    S/P Actemra 12/8  RDV day 3/5  Continue Lovenox daily and dec

## 2020-12-11 ENCOUNTER — APPOINTMENT (OUTPATIENT)
Dept: GENERAL RADIOLOGY | Facility: HOSPITAL | Age: 55
DRG: 177 | End: 2020-12-11
Attending: INTERNAL MEDICINE
Payer: COMMERCIAL

## 2020-12-11 PROCEDURE — 71045 X-RAY EXAM CHEST 1 VIEW: CPT | Performed by: INTERNAL MEDICINE

## 2020-12-11 PROCEDURE — 99232 SBSQ HOSP IP/OBS MODERATE 35: CPT | Performed by: INTERNAL MEDICINE

## 2020-12-11 NOTE — DIETARY NOTE
ADULT NUTRITION INITIAL ASSESSMENT    Pt is at moderate nutrition risk. Pt does not meet malnutrition criteria.       RECOMMENDATIONS TO MD:  See Nutrition Intervention     NUTRITION DIAGNOSIS/PROBLEM:  Inadequate protein energy intake related to physiolog Body mass index is 29.91 kg/m².   BMI CLASSIFICATION: 25-29.9 kg/m2 - overweight  IBW: 178 lbs        123% IBW  Usual Body Wt: 220 lbs       100% UBW    WEIGHT HISTORY:  Patient Weight(s) for the past 336 hrs:   Weight   12/08/20 1100 100 kg (220 lb 8 oz) intact and RN documentation reviewed. MONITOR AND EVALUATE/NUTRITION GOALS:  - Food and Nutrient Intake:      Monitor: adequacy of PO intake, tolerance of PO intake, adequacy of supplement intake and tolerance of supplement intake.   - Anthropometric Sophie

## 2020-12-11 NOTE — PROGRESS NOTES
[facility]    Progress Note    Marci Ortega Patient Status:  Inpatient    3/15/1965 MRN [id number]   Location [facility] Attending Columba Stokes MD   Hosp Day # 3 PCP Agnes Hernadez MD        Subjective:     Luz Kidney stone 2019  Follows urology     *Hx gout  No acute issues/allopurinol        Code status: FULL  VTE prophylaxis: lovenox  GI prophylaxis: pepcid          Results:     Lab Results   Component Value Date    WBC 7.4 12/11/2020    HGB 16.9 12/11/2020

## 2020-12-11 NOTE — PLAN OF CARE
Problem: Patient Centered Care  Goal: Patient preferences are identified and integrated in the patient's plan of care  Description: Interventions  - What would you like us to know as we care for you?  From home with family  - Provide timely, complete, and ADULT  Goal: Absence of fever/infection during anticipated neutropenic period  Description: INTERVENTIONS  - Monitor WBC  - Administer growth factors as ordered  - Implement neutropenic guidelines  Outcome: Progressing     Problem: [address] [facility] behavior  - Position to facilitate oxygenation and minimize respiratory effort  - Oxygen supplementation based on oxygen saturation or ABGs  - Provide Smoking Cessation handout, if applicable  - Encourage broncho-pulmonary hygiene including cough, deep jeanie

## 2020-12-11 NOTE — PROGRESS NOTES
[facility] - [facility]    Progress Note    Dane Wooten Patient Status:  Inpatient    3/15/1965 MRN [id number]   Location [address] [facility] Attending Hakan Hernandez MD   Hosp Day # 3 PCP Arvind Tian MD        Subjective:     Eltio 12/11/2020    CREATSERUM 1.27 12/11/2020    BUN 36 (H) 12/11/2020     12/11/2020    K 4.6 12/11/2020     12/11/2020    CO2 27.0 12/11/2020     (H) 12/11/2020    CA 9.3 12/11/2020    ALB 2.9 (L) 12/11/2020    ALKPHO 88 12/11/2020    BILT

## 2020-12-11 NOTE — PROGRESS NOTES
Pt covid+ 11/30. Symptoms started 11/30  Currently on 5L NC SpO2 90%, afebrile. Inflammatory markers trending up, except CRP trending down. Pt self-proning as tolerated. De-sat with minimal movement. CCP ordered 12/8 - awaiting.    S/P Actemra 12/8  R Patient tolerated procedure well.

## 2020-12-11 NOTE — PROGRESS NOTES
INFECTIOUS DISEASE PROGRESS NOTE    Marci Ortega Patient Status:  Inpatient    3/15/1965 MRN [id number]   Location [facility] Attending Columba Stokes MD   Hosp Day # 3 PCP Agnes Hernadez MD     Subjective:  ROS done.  Still with high O2 58-year-old male with a history of CHF, obesity who now presents to hospital with symptoms starting last Monday 11/30 including cough, shortness of breath, fevers, fatigue, chest discomfort 9 or to be hypoxic in the mid 80s at home.   Was started on azithro

## 2020-12-12 PROCEDURE — 99233 SBSQ HOSP IP/OBS HIGH 50: CPT | Performed by: INTERNAL MEDICINE

## 2020-12-12 PROCEDURE — 99232 SBSQ HOSP IP/OBS MODERATE 35: CPT | Performed by: INTERNAL MEDICINE

## 2020-12-12 RX ORDER — BENZONATATE 100 MG/1
100 CAPSULE ORAL 3 TIMES DAILY PRN
Status: DISCONTINUED | OUTPATIENT
Start: 2020-12-12 | End: 2020-12-13

## 2020-12-12 RX ORDER — ZOLPIDEM TARTRATE 5 MG/1
5 TABLET ORAL NIGHTLY PRN
Status: DISCONTINUED | OUTPATIENT
Start: 2020-12-12 | End: 2020-12-13

## 2020-12-12 NOTE — PLAN OF CARE
Problem: Patient Centered Care  Goal: Patient preferences are identified and integrated in the patient's plan of care  Description: Interventions  - What would you like us to know as we care for you?  From home with family  - Provide timely, complete, and anxiety  - Utilize distraction and/or relaxation techniques  - Monitor for opioid side effects  - Notify MD/LIP if interventions unsuccessful or patient reports new pain  - Anticipate increased pain with activity and pre-medicate as appropriate  Outcome: P

## 2020-12-12 NOTE — PLAN OF CARE
Patient ambulated in the hallway. Spo2 dropped to 86% on 5LHFNC. He then stabilized to 88%. Patient tolerated well. Returned to room and patient sat up in the chair. Spo2 returned to 90%.

## 2020-12-12 NOTE — PLAN OF CARE
Problem: Patient Centered Care  Goal: Patient preferences are identified and integrated in the patient's plan of care  Description: Interventions  - What would you like us to know as we care for you?  From home with family  - Provide timely, complete, and ADULT  Goal: Absence of fever/infection during anticipated neutropenic period  Description: INTERVENTIONS  - Monitor WBC  - Administer growth factors as ordered  - Implement neutropenic guidelines  Outcome: Progressing     Problem: [facility] behavior  - Position to facilitate oxygenation and minimize respiratory effort  - Oxygen supplementation based on oxygen saturation or ABGs  - Provide Smoking Cessation handout, if applicable  - Encourage broncho-pulmonary hygiene including cough, deep jeanie

## 2020-12-12 NOTE — PROGRESS NOTES
[facility]    Progress Note    Derrick Pushpa Patient Status:  Inpatient    3/15/1965 MRN [id number]   Location [address] [facility] Attending Kacie Elizalde MD   Hosp Day # 4 PCP Nelda Hernandez MD        Subjective:     Luz Constitutional: He is oriented to person, place, and time. Vital signs are normal. He appears well-developed and well-nourished. Non-toxic appearance. He does not have a sickly appearance. He does not appear ill. No distress. He is not intubated.  Nasal to above. History of gout. On allopurinol. Nothing acute. Hypoxia  Slight improvement. Decreasing oxygen requirements. But still on 6 L. May need oxygen at home. Social work. Depression. Psych.      DVT prophylaxis on Lovenox 40 da

## 2020-12-12 NOTE — PROGRESS NOTES
[facility]    Progress Note      Assessment and Plan:   1. Coronavirus pneumonia– The infiltrates are moderately extensive. Remdesivir has been initiated.    The inflammatory markers are significantly elevated, and Actemra has been given 12/12/2020    BILT 0.6 12/12/2020    TP 6.7 12/12/2020    AST 39 12/12/2020     12/12/2020         Man Boateng MD  Medical Director, [address], [facility]  Medical Director, [address] [place] [address]. [phone]  Pager: [phone]

## 2020-12-13 VITALS
HEART RATE: 77 BPM | WEIGHT: 220.5 LBS | HEIGHT: 72 IN | DIASTOLIC BLOOD PRESSURE: 77 MMHG | TEMPERATURE: 97 F | RESPIRATION RATE: 20 BRPM | OXYGEN SATURATION: 89 % | SYSTOLIC BLOOD PRESSURE: 129 MMHG | BODY MASS INDEX: 29.87 KG/M2

## 2020-12-13 PROCEDURE — 99238 HOSP IP/OBS DSCHRG MGMT 30/<: CPT | Performed by: INTERNAL MEDICINE

## 2020-12-13 PROCEDURE — 99232 SBSQ HOSP IP/OBS MODERATE 35: CPT | Performed by: INTERNAL MEDICINE

## 2020-12-13 RX ORDER — FAMOTIDINE 20 MG/1
20 TABLET ORAL 2 TIMES DAILY
Qty: 60 TABLET | Refills: 0 | Status: SHIPPED | OUTPATIENT
Start: 2020-12-13 | End: 2020-12-15

## 2020-12-13 RX ORDER — BENZONATATE 100 MG/1
100 CAPSULE ORAL 3 TIMES DAILY PRN
Qty: 10 CAPSULE | Refills: 0 | Status: SHIPPED | OUTPATIENT
Start: 2020-12-13 | End: 2020-12-15

## 2020-12-13 NOTE — PROGRESS NOTES
[facility]    Progress Note      Assessment and Plan:   1. Coronavirus pneumonia– The infiltrates are moderately extensive. Remdesivir has been initiated.    The inflammatory markers are significantly elevated, and Actemra has been given 12/13/2020    CA 8.8 12/13/2020    ALB 2.9 12/13/2020    ALKPHO 69 12/13/2020    BILT 0.6 12/13/2020    TP 6.8 12/13/2020    AST 39 12/13/2020     12/13/2020         Adeola Encinas MD  Medical Director, [address], [facility]  Medical Director, [facility] [place]

## 2020-12-13 NOTE — CM/SW NOTE
1205: SW received completed/signed [facility] order for O2 and sent to [facility] via Joe. [facility] has accepted patient. [facility] delivered O2 tank to RN to give to patient prior to d/c.  Once patient confirms he has spoken with [facility] to arrange for home concentrator delivery, pooja

## 2020-12-13 NOTE — PLAN OF CARE
Problem: Patient Centered Care  Goal: Patient preferences are identified and integrated in the patient's plan of care  Description: Interventions  - What would you like us to know as we care for you?  From home with family  - Provide timely, complete, and ADULT  Goal: Absence of fever/infection during anticipated neutropenic period  Description: INTERVENTIONS  - Monitor WBC  - Administer growth factors as ordered  - Implement neutropenic guidelines  Outcome: Progressing     Problem: [address] [facility] behavior  - Position to facilitate oxygenation and minimize respiratory effort  - Oxygen supplementation based on oxygen saturation or ABGs  - Provide Smoking Cessation handout, if applicable  - Encourage broncho-pulmonary hygiene including cough, deep jeanie

## 2020-12-13 NOTE — DISCHARGE SUMMARY
DISCHARGE SUMMARY     Harrison Rocha Patient Status:  Inpatient    3/15/1965 MRN [id number]   Location [facility]W Attending Cuco Haynes MD   Hosp Day # 5 PCP Naila Main MD     Date of Admission: 2020  Date of Discharge: 20 acute.       Hypoxia  Slight improvement. Decreasing oxygen requirements. But still on 4 L. May need oxygen at home. Social work.        Depression. Psych.      GI prophylaxis on Pepcid. CODE STATUS full. Discussed with patient.   Discussed with RN 20 MG Tabs         Discharge Plan:  Discharge Condition: Fair    Current Discharge Medication List    New Orders    benzonatate 100 MG Oral Cap  Take 1 capsule (100 mg total) by mouth 3 (three) times daily as needed for cough.     famoTIDine 20 MG Oral Tab

## 2020-12-13 NOTE — PLAN OF CARE
Problem: Patient Centered Care  Goal: Patient preferences are identified and integrated in the patient's plan of care  Description: Interventions  - What would you like us to know as we care for you?  From home with family  - Provide timely, complete, and Verbalizes/displays adequate comfort level or patient's stated pain goal  Description: INTERVENTIONS:  - Encourage pt to monitor pain and request assistance  - Assess pain using appropriate pain scale  - Administer analgesics based on type and severity of DISCHARGE PLANNING  Goal: Discharge to home or other facility with appropriate resources  Description: INTERVENTIONS:  - Identify barriers to discharge w/pt and caregiver  - Include patient/family/discharge partner in discharge planning  - Arrange for need saline lock removed. Patient given home pulse ox and educated on the use of it and when to call the dr if increased shortness of breath or sustained increase in oxygen requirements.   Patient expressed a verbal understanding of all dc instructions

## 2020-12-14 NOTE — PAYOR COMM NOTE
--------------  CONTINUED STAY REVIEW    Edinender Mary PP  Subscriber #:  [id number]  Authorization Number: [id number]      Admit date: 12/8/20  Admit time: 1059    Admitting Physician: Ernesto Crouch MD  Attending Physician:  No att. providers found  Primar abnormality     Results:            Lab Results   Component Value Date     CREATSERUM 1.25 12/12/2020     BUN 33 12/12/2020      12/12/2020     K 4.8 12/12/2020      12/12/2020     CO2 30.0 12/12/2020      12/12/2020     CA 9.0 12/12/202 are negative.             Objective:   Blood pressure 127/84, pulse 77, temperature 98.1 °F (36.7 °C), temperature source Oral, resp. rate 20, height 6' (1.829 m), weight 220 lb 8 oz (100 kg), SpO2 93 %.     Nursing note and vitals reviewed.    Constitution ([facility] [address]  EF in 20 1745% recent echo shows 57%. Clinically euvolemic.       Solitary kidney, acquired. Status post unilateral nephrectomy as a living donor. Creatinine stable. Avoid nephrotoxins.         Elevated LFTs. Slight improvement. Due to above. Paramjit Wang is a(n) 60year old male who is breathing better and is extremely anxious to go home   Objective:   Blood pressure 129/77, pulse 77, temperature 97.4 °F (36.3 °C), temperature source Oral, resp.  rate 20, height 6' (1.829 m), weight 220

## 2020-12-14 NOTE — PAYOR COMM NOTE
--------------  DISCHARGE REVIEW    Pam WALTERS  Subscriber #:  [id number]  Authorization Number: [id number]      Admit date: 12/8/20  Admit time:  1059  Discharge Date: 12/13/2020  4:13 PM     Admitting Physician: Columba Stokes MD  Attending Physician - 12/8               - Abx - 12/1  Patient really wanting to go home. Wants to be with his family. No Xarelto no steroids at home per pulmonary. Spoke with pulmonary / Olga Castillo okay to go home. No heavy activity.         Fever. Improving.   Blood cultur allopurinol 300 MG Tabs  Commonly known as: ZYLOPRIM      Take 1 tablet (300 mg total) by mouth daily.    Quantity: 90 tablet  Refills: 0     guaiFENesin-codeine 100-10 MG/5ML Soln  Commonly known as: Cheratussin AC      Take 5 mL by mouth every 6 (six) h

## 2020-12-15 ENCOUNTER — PATIENT OUTREACH (OUTPATIENT)
Dept: CASE MANAGEMENT | Age: 55
End: 2020-12-15

## 2020-12-15 NOTE — PROGRESS NOTES
1st attempt [facility] apt request    [facility]  [address]  [phone]  [address]    Unable to contact patient. Will try again.

## 2020-12-18 NOTE — PROGRESS NOTES
3rd attempt [facility] apt request     [facility]  [address]  [phone]  [address]    Unable to contact patient after several attempts. No apt made. Closing encounter.

## 2021-02-01 ENCOUNTER — HOSPITAL ENCOUNTER (OUTPATIENT)
Dept: GENERAL RADIOLOGY | Age: 56
Discharge: HOME | End: 2021-02-01
Attending: INTERNAL MEDICINE
Payer: COMMERCIAL

## 2021-02-01 DIAGNOSIS — R06.02 SOB (SHORTNESS OF BREATH): ICD-10-CM

## 2021-02-01 DIAGNOSIS — U07.1 PNEUMONIA DUE TO COVID-19 VIRUS: ICD-10-CM

## 2021-02-01 DIAGNOSIS — J12.82 PNEUMONIA DUE TO COVID-19 VIRUS: ICD-10-CM

## 2021-02-01 PROCEDURE — 71046 X-RAY EXAM CHEST 2 VIEWS: CPT | Performed by: INTERNAL MEDICINE

## 2021-04-27 ENCOUNTER — TELEPHONE (OUTPATIENT)
Dept: SURGERY | Facility: CLINIC | Age: 56
End: 2021-04-27

## 2021-04-27 NOTE — TELEPHONE ENCOUNTER
Received fax from [facility] [place] Dr. Patricia Navarrete procedure note dated 4/23/21 placed on [facility] desk to sign off on then will send to be scanned.

## 2021-04-28 NOTE — TELEPHONE ENCOUNTER
Pt's last TRISTAN 9/19/19 with Dr. Rhona Lozano. Zack procedure note sent to Medical Records by .  Encounter complete.

## 2021-07-26 ENCOUNTER — HOSPITAL ENCOUNTER (EMERGENCY)
Facility: HOSPITAL | Age: 56
Discharge: HOME | End: 2021-07-26
Attending: EMERGENCY MEDICINE
Payer: COMMERCIAL

## 2021-07-26 ENCOUNTER — APPOINTMENT (OUTPATIENT)
Dept: GENERAL RADIOLOGY | Facility: HOSPITAL | Age: 56
End: 2021-07-26
Attending: EMERGENCY MEDICINE
Payer: COMMERCIAL

## 2021-07-26 VITALS
HEART RATE: 73 BPM | OXYGEN SATURATION: 97 % | WEIGHT: 220 LBS | SYSTOLIC BLOOD PRESSURE: 103 MMHG | DIASTOLIC BLOOD PRESSURE: 64 MMHG | BODY MASS INDEX: 30 KG/M2 | TEMPERATURE: 98 F | RESPIRATION RATE: 20 BRPM

## 2021-07-26 DIAGNOSIS — R07.89 CHEST WALL PAIN: Primary | ICD-10-CM

## 2021-07-26 LAB
ANION GAP SERPL CALC-SCNC: 7 MMOL/L (ref 0–18)
BASOPHILS # BLD AUTO: 0.04 X10(3) UL (ref 0–0.2)
BASOPHILS NFR BLD AUTO: 0.5 %
BUN BLD-MCNC: 16 MG/DL (ref 7–18)
BUN/CREAT SERPL: 14.5 (ref 10–20)
CALCIUM BLD-MCNC: 9.4 MG/DL (ref 8.5–10.1)
CHLORIDE SERPL-SCNC: 108 MMOL/L (ref 98–112)
CO2 SERPL-SCNC: 25 MMOL/L (ref 21–32)
CREAT BLD-MCNC: 1.1 MG/DL
D DIMER PPP FEU-MCNC: 0.33 UG/ML FEU (ref ?–0.58)
DEPRECATED RDW RBC AUTO: 43.8 FL (ref 35.1–46.3)
EOSINOPHIL # BLD AUTO: 0.07 X10(3) UL (ref 0–0.7)
EOSINOPHIL NFR BLD AUTO: 0.8 %
ERYTHROCYTE [DISTWIDTH] IN BLOOD BY AUTOMATED COUNT: 13 % (ref 11–15)
GLUCOSE BLD-MCNC: 96 MG/DL (ref 70–99)
HCT VFR BLD AUTO: 47.8 %
HGB BLD-MCNC: 16.1 G/DL
IMM GRANULOCYTES # BLD AUTO: 0.03 X10(3) UL (ref 0–1)
IMM GRANULOCYTES NFR BLD: 0.3 %
LYMPHOCYTES # BLD AUTO: 2.53 X10(3) UL (ref 1–4)
LYMPHOCYTES NFR BLD AUTO: 28.6 %
MCH RBC QN AUTO: 30.7 PG (ref 26–34)
MCHC RBC AUTO-ENTMCNC: 33.7 G/DL (ref 31–37)
MCV RBC AUTO: 91.2 FL
MONOCYTES # BLD AUTO: 0.81 X10(3) UL (ref 0.1–1)
MONOCYTES NFR BLD AUTO: 9.2 %
NEUTROPHILS # BLD AUTO: 5.37 X10 (3) UL (ref 1.5–7.7)
NEUTROPHILS # BLD AUTO: 5.37 X10(3) UL (ref 1.5–7.7)
NEUTROPHILS NFR BLD AUTO: 60.6 %
OSMOLALITY SERPL CALC.SUM OF ELEC: 291 MOSM/KG (ref 275–295)
PLATELET # BLD AUTO: 145 10(3)UL (ref 150–450)
POTASSIUM SERPL-SCNC: 4.8 MMOL/L (ref 3.5–5.1)
RBC # BLD AUTO: 5.24 X10(6)UL
SODIUM SERPL-SCNC: 140 MMOL/L (ref 136–145)
TROPONIN I SERPL-MCNC: <0.045 NG/ML (ref ?–0.04)
WBC # BLD AUTO: 8.9 X10(3) UL (ref 4–11)

## 2021-07-26 PROCEDURE — 99284 EMERGENCY DEPT VISIT MOD MDM: CPT

## 2021-07-26 PROCEDURE — 85379 FIBRIN DEGRADATION QUANT: CPT | Performed by: EMERGENCY MEDICINE

## 2021-07-26 PROCEDURE — 85025 COMPLETE CBC W/AUTO DIFF WBC: CPT | Performed by: EMERGENCY MEDICINE

## 2021-07-26 PROCEDURE — 84484 ASSAY OF TROPONIN QUANT: CPT | Performed by: EMERGENCY MEDICINE

## 2021-07-26 PROCEDURE — 36415 COLL VENOUS BLD VENIPUNCTURE: CPT

## 2021-07-26 PROCEDURE — 71045 X-RAY EXAM CHEST 1 VIEW: CPT | Performed by: EMERGENCY MEDICINE

## 2021-07-26 PROCEDURE — 93010 ELECTROCARDIOGRAM REPORT: CPT | Performed by: INTERNAL MEDICINE

## 2021-07-26 PROCEDURE — 80048 BASIC METABOLIC PNL TOTAL CA: CPT | Performed by: EMERGENCY MEDICINE

## 2021-07-26 PROCEDURE — 93005 ELECTROCARDIOGRAM TRACING: CPT

## 2021-07-26 NOTE — ED INITIAL ASSESSMENT (HPI)
Patient complains of chest pain and diff in breathing for about a week, states the pain comes when he tries to take a deep breath and it is at the site of the sternum

## 2021-07-26 NOTE — ED PROVIDER NOTES
Patient Seen in: [facility] AND [facility] Emergency Department      History   Patient presents with:  Difficulty Breathing    Stated Complaint: chest pain for 1 wk     HPI/Subjective:   HPI    57-year-old male with history of COVID-19 pneumonia in December pres injection  ENT, Mouth: mucous membranes moist  Respiratory: there are no retractions, lungs are clear to auscultation  Cardiovascular: regular rate and rhythm  Gastrointestinal:  abdomen is soft and non tender, no masses, bowel sounds normal  Neurological: MD  [address] [place] [address]  [phone]                Medications Prescribed:  Current Discharge Medication List

## 2021-09-27 ENCOUNTER — OFFICE VISIT (OUTPATIENT)
Dept: PULMONOLOGY | Facility: CLINIC | Age: 56
End: 2021-09-27
Payer: COMMERCIAL

## 2021-09-27 VITALS
SYSTOLIC BLOOD PRESSURE: 126 MMHG | TEMPERATURE: 98 F | DIASTOLIC BLOOD PRESSURE: 87 MMHG | HEART RATE: 103 BPM | BODY MASS INDEX: 30.39 KG/M2 | RESPIRATION RATE: 15 BRPM | OXYGEN SATURATION: 97 % | HEIGHT: 72 IN | WEIGHT: 224.38 LBS

## 2021-09-27 DIAGNOSIS — M94.8X8 XIPHOIDITIS: Primary | ICD-10-CM

## 2021-09-27 PROCEDURE — 99213 OFFICE O/P EST LOW 20 MIN: CPT | Performed by: INTERNAL MEDICINE

## 2021-09-27 PROCEDURE — 3079F DIAST BP 80-89 MM HG: CPT | Performed by: INTERNAL MEDICINE

## 2021-09-27 PROCEDURE — 3074F SYST BP LT 130 MM HG: CPT | Performed by: INTERNAL MEDICINE

## 2021-09-27 PROCEDURE — 3008F BODY MASS INDEX DOCD: CPT | Performed by: INTERNAL MEDICINE

## 2021-09-27 NOTE — PROGRESS NOTES
The patient is a 58-year-old male who I know from prior hospitalization for coronavirus pneumonitis.   He is doing wonderfully and has survived although he has had a couple subsequent interactions with the medical community including a CT scan of the chest

## 2021-11-19 ENCOUNTER — TELEPHONE (OUTPATIENT)
Dept: SURGERY | Facility: CLINIC | Age: 56
End: 2021-11-19

## 2021-11-19 NOTE — TELEPHONE ENCOUNTER
Fax received from [address] [place] [address] cysto note dated 10/22/21 scanned into pt chart.

## 2022-05-09 ENCOUNTER — HOSPITAL ENCOUNTER (OUTPATIENT)
Dept: GENERAL RADIOLOGY | Age: 57
Discharge: HOME | End: 2022-05-09
Attending: INTERNAL MEDICINE
Payer: COMMERCIAL

## 2022-05-09 DIAGNOSIS — R53.83 OTHER FATIGUE: ICD-10-CM

## 2022-05-09 PROBLEM — J44.9 CHRONIC OBSTRUCTIVE PULMONARY DISEASE, UNSPECIFIED (HCC): Status: ACTIVE | Noted: 2022-05-09

## 2022-05-09 PROCEDURE — 71046 X-RAY EXAM CHEST 2 VIEWS: CPT | Performed by: INTERNAL MEDICINE

## 2022-06-08 ENCOUNTER — HOSPITAL ENCOUNTER (EMERGENCY)
Facility: HOSPITAL | Age: 57
Discharge: HOME | End: 2022-06-08
Payer: COMMERCIAL

## 2022-06-08 ENCOUNTER — APPOINTMENT (OUTPATIENT)
Dept: GENERAL RADIOLOGY | Facility: HOSPITAL | Age: 57
End: 2022-06-08
Payer: COMMERCIAL

## 2022-06-08 VITALS
SYSTOLIC BLOOD PRESSURE: 129 MMHG | BODY MASS INDEX: 31.15 KG/M2 | RESPIRATION RATE: 17 BRPM | HEIGHT: 72 IN | DIASTOLIC BLOOD PRESSURE: 60 MMHG | WEIGHT: 230 LBS | OXYGEN SATURATION: 97 % | TEMPERATURE: 98 F | HEART RATE: 74 BPM

## 2022-06-08 DIAGNOSIS — R10.13 EPIGASTRIC PAIN: Primary | ICD-10-CM

## 2022-06-08 LAB
ALBUMIN SERPL-MCNC: 3.9 G/DL (ref 3.4–5)
ALP LIVER SERPL-CCNC: 86 U/L
ALT SERPL-CCNC: 32 U/L
ANION GAP SERPL CALC-SCNC: 6 MMOL/L (ref 0–18)
AST SERPL-CCNC: 21 U/L (ref 15–37)
BASOPHILS # BLD AUTO: 0.03 X10(3) UL (ref 0–0.2)
BASOPHILS NFR BLD AUTO: 0.3 %
BILIRUB DIRECT SERPL-MCNC: <0.1 MG/DL (ref 0–0.2)
BILIRUB SERPL-MCNC: 0.5 MG/DL (ref 0.1–2)
BUN BLD-MCNC: 21 MG/DL (ref 7–18)
BUN/CREAT SERPL: 16.8 (ref 10–20)
CALCIUM BLD-MCNC: 9.5 MG/DL (ref 8.5–10.1)
CHLORIDE SERPL-SCNC: 103 MMOL/L (ref 98–112)
CO2 SERPL-SCNC: 28 MMOL/L (ref 21–32)
CREAT BLD-MCNC: 1.25 MG/DL
DEPRECATED RDW RBC AUTO: 44.7 FL (ref 35.1–46.3)
EOSINOPHIL # BLD AUTO: 0.14 X10(3) UL (ref 0–0.7)
EOSINOPHIL NFR BLD AUTO: 1.4 %
ERYTHROCYTE [DISTWIDTH] IN BLOOD BY AUTOMATED COUNT: 13.1 % (ref 11–15)
GLUCOSE BLD-MCNC: 117 MG/DL (ref 70–99)
HCT VFR BLD AUTO: 47.6 %
HGB BLD-MCNC: 15.7 G/DL
IMM GRANULOCYTES # BLD AUTO: 0.04 X10(3) UL (ref 0–1)
IMM GRANULOCYTES NFR BLD: 0.4 %
LIPASE SERPL-CCNC: 180 U/L (ref 73–393)
LYMPHOCYTES # BLD AUTO: 2.61 X10(3) UL (ref 1–4)
LYMPHOCYTES NFR BLD AUTO: 25.5 %
MCH RBC QN AUTO: 30.5 PG (ref 26–34)
MCHC RBC AUTO-ENTMCNC: 33 G/DL (ref 31–37)
MCV RBC AUTO: 92.6 FL
MONOCYTES # BLD AUTO: 0.84 X10(3) UL (ref 0.1–1)
MONOCYTES NFR BLD AUTO: 8.2 %
NEUTROPHILS # BLD AUTO: 6.59 X10 (3) UL (ref 1.5–7.7)
NEUTROPHILS # BLD AUTO: 6.59 X10(3) UL (ref 1.5–7.7)
NEUTROPHILS NFR BLD AUTO: 64.2 %
OSMOLALITY SERPL CALC.SUM OF ELEC: 288 MOSM/KG (ref 275–295)
PLATELET # BLD AUTO: 154 10(3)UL (ref 150–450)
POTASSIUM SERPL-SCNC: 3.7 MMOL/L (ref 3.5–5.1)
PROT SERPL-MCNC: 7.4 G/DL (ref 6.4–8.2)
RBC # BLD AUTO: 5.14 X10(6)UL
SODIUM SERPL-SCNC: 137 MMOL/L (ref 136–145)
TROPONIN I HIGH SENSITIVITY: 3 NG/L
TROPONIN I HIGH SENSITIVITY: 3 NG/L
WBC # BLD AUTO: 10.3 X10(3) UL (ref 4–11)

## 2022-06-08 PROCEDURE — 84484 ASSAY OF TROPONIN QUANT: CPT | Performed by: STUDENT IN AN ORGANIZED HEALTH CARE EDUCATION/TRAINING PROGRAM

## 2022-06-08 PROCEDURE — 93005 ELECTROCARDIOGRAM TRACING: CPT

## 2022-06-08 PROCEDURE — 84484 ASSAY OF TROPONIN QUANT: CPT

## 2022-06-08 PROCEDURE — 85025 COMPLETE CBC W/AUTO DIFF WBC: CPT

## 2022-06-08 PROCEDURE — 71045 X-RAY EXAM CHEST 1 VIEW: CPT

## 2022-06-08 PROCEDURE — 36415 COLL VENOUS BLD VENIPUNCTURE: CPT

## 2022-06-08 PROCEDURE — 93010 ELECTROCARDIOGRAM REPORT: CPT | Performed by: INTERNAL MEDICINE

## 2022-06-08 PROCEDURE — 80048 BASIC METABOLIC PNL TOTAL CA: CPT

## 2022-06-08 PROCEDURE — 80076 HEPATIC FUNCTION PANEL: CPT

## 2022-06-08 PROCEDURE — 83690 ASSAY OF LIPASE: CPT

## 2022-06-08 PROCEDURE — 99284 EMERGENCY DEPT VISIT MOD MDM: CPT

## 2022-06-09 NOTE — ED INITIAL ASSESSMENT (HPI)
Pt to ED with c/o mid chest discomfort after eating a chicken zana about one hour prior to arrival. Pt states same sensation yesterday. Pt denies cough or fever. Pt states dyspnea with exertion since COVID 12-8-2020. No respiratory distress noted. 98% on room air. Pt is alert oriented x4. Pt denies nausea or vomiting.

## 2022-06-13 ENCOUNTER — HOSPITAL ENCOUNTER (OUTPATIENT)
Dept: GENERAL RADIOLOGY | Age: 57
Discharge: HOME | End: 2022-06-13
Attending: INTERNAL MEDICINE
Payer: COMMERCIAL

## 2022-06-13 DIAGNOSIS — M79.671 CHRONIC HEEL PAIN, RIGHT: ICD-10-CM

## 2022-06-13 DIAGNOSIS — G89.29 CHRONIC HEEL PAIN, LEFT: ICD-10-CM

## 2022-06-13 DIAGNOSIS — G89.29 CHRONIC HEEL PAIN, RIGHT: ICD-10-CM

## 2022-06-13 DIAGNOSIS — M79.672 CHRONIC HEEL PAIN, LEFT: ICD-10-CM

## 2022-06-13 PROCEDURE — 73650 X-RAY EXAM OF HEEL: CPT | Performed by: INTERNAL MEDICINE

## 2023-06-26 ENCOUNTER — TELEPHONE (OUTPATIENT)
Dept: SURGERY | Facility: CLINIC | Age: 58
End: 2023-06-26

## 2023-06-26 NOTE — TELEPHONE ENCOUNTER
Received fax from [facility] [place] [address] with office notes from 6/23/23. Patient has not seen Trey Cramer since 2019. Will send copy of note to scanning. No future appointments.

## 2024-06-14 PROBLEM — C67.9 MALIGNANT NEOPLASM OF URINARY BLADDER (HCC): Status: ACTIVE | Noted: 2022-12-06

## 2025-04-08 ENCOUNTER — HOSPITAL ENCOUNTER (OUTPATIENT)
Dept: GENERAL RADIOLOGY | Age: 60
Discharge: HOME | End: 2025-04-08
Payer: COMMERCIAL

## 2025-04-08 DIAGNOSIS — R05.1 ACUTE COUGH: ICD-10-CM

## 2025-04-08 PROCEDURE — 71046 X-RAY EXAM CHEST 2 VIEWS: CPT | Performed by: NURSE PRACTITIONER

## 2025-06-17 ENCOUNTER — OFFICE VISIT (OUTPATIENT)
Dept: OTOLARYNGOLOGY | Facility: CLINIC | Age: 60
End: 2025-06-17

## 2025-06-17 VITALS — HEIGHT: 72 IN | WEIGHT: 230 LBS | BODY MASS INDEX: 31.15 KG/M2

## 2025-06-17 DIAGNOSIS — H91.90 HEARING LOSS, UNSPECIFIED HEARING LOSS TYPE, UNSPECIFIED LATERALITY: Primary | ICD-10-CM

## 2025-06-17 DIAGNOSIS — J34.2 NASAL SEPTAL DEVIATION: ICD-10-CM

## 2025-06-17 DIAGNOSIS — B96.89 ACUTE BACTERIAL SINUSITIS: ICD-10-CM

## 2025-06-17 DIAGNOSIS — J01.90 ACUTE BACTERIAL SINUSITIS: ICD-10-CM

## 2025-06-17 DIAGNOSIS — H69.93 DYSFUNCTION OF BOTH EUSTACHIAN TUBES: ICD-10-CM

## 2025-06-17 PROCEDURE — 99203 OFFICE O/P NEW LOW 30 MIN: CPT | Performed by: SPECIALIST

## 2025-06-17 PROCEDURE — 3008F BODY MASS INDEX DOCD: CPT | Performed by: SPECIALIST

## 2025-06-17 NOTE — PATIENT INSTRUCTIONS
You had a right septal deviation and right sinusitis.  Culture was taken.  I will call you with the results and place you on an antibiotic most likely with prednisone after these results are obtained.  You also had retraction of your bilateral tympanic membranes right greater than left.  No middle ear fluid on the day to your visit.  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  Follow-up with me in the [place] office in 3 weeks time, at that point the sinuses will be rechecked.  I also will get an audiogram to check for your underlying hearing loss.

## 2025-06-17 NOTE — PROGRESS NOTES
Sai Quiroz is a 60 year old male.   Chief Complaint   Patient presents with    New Patient    Hearing Loss     Hearing loss       HPI:   Here has been seeing a worsening of his hearing loss.  He has had problems since childhood.  This has been worse on the right than the left.  Also has been coughing up purulent secretions.    Current Medications[1]   Past Medical History[2]   Social History:  Short Social Hx on File[3]     REVIEW OF SYSTEMS:   GENERAL HEALTH: feels well otherwise  GENERAL : denies fever, chills, sweats, weight loss, weight gain  SKIN: denies any unusual skin lesions or rashes  RESPIRATORY: denies shortness of breath with exertion  NEURO: denies headaches    EXAM:   Ht 6' (1.829 m)   Wt 230 lb (104.3 kg)   BMI 31.19 kg/m²   System Details   Skin Inspection - Normal.   Constitutional Overall appearance - Normal.   Head/Face Facial features - Normal. Eyebrows - Normal. Skull - Normal.   Eyes Conjunctiva - Right: Normal, Left: Normal. Pupil - Right: Normal, Left: Normal.    Ears Inspection - Right: Normal, Left: Normal.   Canal - Right: Normal, Left: Normal.   TM -retraction of the tympanic membranes right greater than left.  No middle ear fluid seen either ear.  No permanent scarring onto the middle ear as patient able to equalize both ears with a Valsalva.   Nasal External nose - Normal.   Nasal septum -moderate to severe right septal deviation  Turbinates - Normal.  Purulence on the right side.  Middle meatal culture taken.   Oral/Oropharynx Lips - Normal, Tonsils - Normal, Tongue - Normal.  Purulent postnasal drainage.   Neck Exam Inspection - Normal. Palpation - Normal. Parotid gland - Normal. Thyroid gland - Normal.   Lymph Detail Submental. Submandibular. Anterior cervical. Posterior cervical. Supraclavicular all without enlargement   Psychiatric Orientation - Oriented to time, place, person & situation. Appropriate mood and affect.   Neurological Memory - Normal. Cranial nerves -  Cranial nerves II through XII grossly intact.     ASSESSMENT AND PLAN:   1. Hearing loss, unspecified hearing loss type, unspecified laterality  Suspect the hearing loss is a combination of eustachian tube dysfunction as well as noise pattern hearing loss and probably some presbycusis as well.  I will get an audiogram on his return.    2. Acute bacterial sinusitis  Culture taken as patient already has been on 2 different antibiotics without resolution of his symptoms.  Sinusitis is on the right.  - Aerobic Bacterial Culture    3. Nasal septal deviation  Right septal deviation.    4. Dysfunction of both eustachian tubes  Right worse than left.  Follow-up in 3 to 4 weeks time in the [place] office, sooner if problems.      The patient indicates understanding of these issues and agrees to the plan.      Delilah Coyle MD  6/17/2025  4:00 PM       [1]   Current Outpatient Medications   Medication Sig Dispense Refill    Phentermine HCl 37.5 MG Oral Tab Take 1 tablet (37.5 mg total) by mouth every morning before breakfast. 30 tablet 0    ALPRAZolam 0.25 MG Oral Tab Take 1 tablet (0.25 mg total) by mouth 2 (two) times daily as needed. 40 tablet 0    FLUoxetine HCl 40 MG Oral Cap Take 1 capsule (40 mg total) by mouth every morning.      propranolol 10 MG Oral Tab Take 1 tablet (10 mg total) by mouth 2 (two) times daily.     [2]   Past Medical History:   Chronic obstructive pulmonary disease, unspecified (HCC)    COVID-19    Headache    Status post coronary angiogram    Good Rastafari - apical disease and EF of 45%    Systolic CHF ([facility])    EF=45% 2011   [3]   Social History  Socioeconomic History    Marital status:     Number of children: 3   Occupational History    Occupation:  relays for cars   Tobacco Use    Smoking status: Never    Smokeless tobacco: Never   Vaping Use    Vaping status: Never Used   Substance and Sexual Activity    Alcohol use: Yes     Alcohol/week: 0.8 standard drinks of  alcohol     Types: 1 Standard drinks or equivalent per week    Drug use: No    Sexual activity: Yes     Partners: Female

## 2025-06-20 ENCOUNTER — PATIENT MESSAGE (OUTPATIENT)
Dept: OTOLARYNGOLOGY | Facility: CLINIC | Age: 60
End: 2025-06-20

## 2025-06-20 RX ORDER — SULFAMETHOXAZOLE AND TRIMETHOPRIM 800; 160 MG/1; MG/1
1 TABLET ORAL EVERY 12 HOURS
Qty: 28 TABLET | Refills: 0 | Status: SHIPPED | OUTPATIENT
Start: 2025-06-20

## 2025-06-20 NOTE — PROGRESS NOTES
Growing 2 different bacteria.  I will place patient on bactrim.  This can make you sun sensitive.  Use for 14 days and follow up.

## 2025-06-21 ENCOUNTER — HOSPITAL ENCOUNTER (OUTPATIENT)
Dept: CT IMAGING | Age: 60
Discharge: HOME | End: 2025-06-21
Attending: INTERNAL MEDICINE

## 2025-06-21 DIAGNOSIS — Z13.6 SCREENING FOR HEART DISEASE: ICD-10-CM

## (undated) NOTE — MR AVS SNAPSHOT
[facility]  [address], [address]  [address] [facility]  [phone]               Thank you for choosing us for your health care visit with Kenyatta Kohler. DO Est[place].   We are glad to serve you and happy to provide you with this sum Basic Metabolic Panel (8) [E]    Complete by:  Jan 12, 2017 (Approximate)    Assoc Dx:  Decreased cardiac ejection fraction [R93.1]                 Scheduling Instructions     Thursday January 12, 2017     Cardiology:  CARD ECHO 2D DOPPLER (CPT=93306) [employer] [facility] [address] [place], [place] [address] for making healthy food choices  -   Enjoy your food, but eat less. Fully enjoy your food when eating. Don’t eat while distracted and slow down. Avoid over sized portions. Don’t eat while when you’re bored.

## (undated) NOTE — LETTER
No referring provider defined for this encounter. 07/22/19        Patient: Stephany Mccurdy   YOB: 1965   Date of Visit: 7/22/2019       Dear  Dr. Law Cordoba MD,      Thank you for referring Stephany Mccurdy to my practice. non-obstructing stone in the inferior pole of the RIGHT kidney; no evidence of hydronephrosis or hydroureter. The patient has gout. 1/16/2019 serum uric acid = 8.8 (NL 4.0-8.0); hence, patient could have a radiolucent renal stone.  I fully explained to lisandra (M10.9) Gout, unspecified cause, unspecified chronicity, unspecified site  1/16/2019 serum uric acid = 8.8 (NL 4.0-8.0). I explained to patient and his wife that this raises the suspicion that his stone is uric acid in composition.       RECOMMENDATIONS AND Dr. Nabeel Hernadez M.D.      Document electronically generated by:  Constantin Couch

## (undated) NOTE — MR AVS SNAPSHOT
[id number]  [address], [address]  [address] [facility]  [phone]               Thank you for choosing us for your health care visit with Ricki Ruff. DO [place].   We are glad to serve you and happy to provide you with this sum Fax:  [phone]         Referral Orders      Normal Orders This Visit    CARDIO - INTERNAL [[id number] CPT(R)]  Order #:  [id number]         **REFERRAL REQUEST**    Your physician has referred you to a specialist.  Your physician or the clinic staff will p